# Patient Record
Sex: FEMALE | URBAN - METROPOLITAN AREA
[De-identification: names, ages, dates, MRNs, and addresses within clinical notes are randomized per-mention and may not be internally consistent; named-entity substitution may affect disease eponyms.]

---

## 2022-11-16 ENCOUNTER — OFFICE VISIT (OUTPATIENT)
Dept: URBAN - METROPOLITAN AREA CLINIC 27 | Facility: CLINIC | Age: 32
End: 2022-11-16
Payer: MEDICAID

## 2022-11-16 ENCOUNTER — LAB OUTSIDE AN ENCOUNTER (OUTPATIENT)
Dept: URBAN - METROPOLITAN AREA CLINIC 27 | Facility: CLINIC | Age: 32
End: 2022-11-16

## 2022-11-16 VITALS
HEART RATE: 64 BPM | DIASTOLIC BLOOD PRESSURE: 74 MMHG | BODY MASS INDEX: 24.48 KG/M2 | WEIGHT: 133 LBS | HEIGHT: 62 IN | SYSTOLIC BLOOD PRESSURE: 115 MMHG

## 2022-11-16 DIAGNOSIS — R10.12 LEFT UPPER QUADRANT ABDOMINAL PAIN: ICD-10-CM

## 2022-11-16 DIAGNOSIS — K59.01 CONSTIPATION: ICD-10-CM

## 2022-11-16 PROCEDURE — 99204 OFFICE O/P NEW MOD 45 MIN: CPT | Performed by: INTERNAL MEDICINE

## 2022-11-16 RX ORDER — DICYCLOMINE HYDROCHLORIDE 10 MG/1
1-2 CAPSULES CAPSULE ORAL
Qty: 90 | Refills: 3 | OUTPATIENT
Start: 2022-11-16 | End: 2023-03-16

## 2022-11-16 NOTE — HPI-TODAY'S VISIT:
32-year-old female here for left-sided abdominal pain.  Pain is in the left upper quadrant, left flank and radiates to her back.  She has had this discomfort for years.  She had uterine fibroids removed which did not help.  She had not endoscopy at an outside facility that showed H. pylori.  She was given antibiotics and breath test confirmed eradication of bacteria.  No improvement despite treatment of H. pylori.  Denies melena, dysphagia, weight loss.  She also has constipation, can go 1 to 2 weeks without a bowel movement if she does not take laxatives.  She has tried over-the-counter Dulcolax, MiraLAX, fiber supplements without good response.  Pain typically does not get better with bowel movements. No FH of colon cancer or IBD.

## 2022-11-18 ENCOUNTER — P2P PATIENT RECORD (OUTPATIENT)
Age: 32
End: 2022-11-18

## 2022-11-23 ENCOUNTER — TELEPHONE ENCOUNTER (OUTPATIENT)
Dept: URBAN - METROPOLITAN AREA CLINIC 27 | Facility: CLINIC | Age: 32
End: 2022-11-23

## 2022-12-01 ENCOUNTER — OFFICE VISIT (OUTPATIENT)
Dept: URBAN - METROPOLITAN AREA CLINIC 27 | Facility: CLINIC | Age: 32
End: 2022-12-01
Payer: MEDICAID

## 2022-12-01 VITALS
DIASTOLIC BLOOD PRESSURE: 62 MMHG | WEIGHT: 133 LBS | SYSTOLIC BLOOD PRESSURE: 101 MMHG | BODY MASS INDEX: 24.48 KG/M2 | HEIGHT: 62 IN | HEART RATE: 80 BPM

## 2022-12-01 DIAGNOSIS — R10.12 LEFT UPPER QUADRANT ABDOMINAL PAIN: ICD-10-CM

## 2022-12-01 DIAGNOSIS — K59.04 CHRONIC IDIOPATHIC CONSTIPATION: ICD-10-CM

## 2022-12-01 PROCEDURE — 99214 OFFICE O/P EST MOD 30 MIN: CPT | Performed by: INTERNAL MEDICINE

## 2022-12-01 RX ORDER — OMEPRAZOLE 40 MG/1
1 CAPSULE 30 MINUTES BEFORE MORNING MEAL CAPSULE, DELAYED RELEASE ORAL ONCE A DAY
Qty: 30 | Refills: 5 | OUTPATIENT
Start: 2022-12-01

## 2022-12-01 RX ORDER — DICYCLOMINE HYDROCHLORIDE 10 MG/1
1-2 CAPSULES CAPSULE ORAL
Qty: 90 | Refills: 3 | Status: ACTIVE | COMMUNITY
Start: 2022-11-16 | End: 2023-03-16

## 2022-12-01 NOTE — HPI-TODAY'S VISIT:
32-year-old female here for follow-up of abdominal pain.  Pain is predominantly in the left upper quadrant and radiates to her back.  She has had this discomfort for years.  She had uterine fibroids removed which did not help.  She had an EGD that showed H. pylori, was given antibiotics.  She cannot recall if she had a breath test to confirm eradication.  No improvement despite antibiotics.  She also has constipation.  She was given Linzess samples at last visit which helped, but the 145 mcg caused diarrhea and 72 mcg did not help.  She has tried multiple over-the-counter laxatives without improvement.  She does use marijuana on a daily basis. No FH of colon cancer. CT was normal.

## 2022-12-02 LAB
H PYLORI BREATH TEST: DETECTED
H. PYLORI BREATH TEST: DETECTED
INTERPRETATION: DETECTED

## 2022-12-06 ENCOUNTER — OFFICE VISIT (OUTPATIENT)
Dept: URBAN - METROPOLITAN AREA CLINIC 27 | Facility: CLINIC | Age: 32
End: 2022-12-06

## 2022-12-08 ENCOUNTER — TELEPHONE ENCOUNTER (OUTPATIENT)
Dept: URBAN - METROPOLITAN AREA CLINIC 27 | Facility: CLINIC | Age: 32
End: 2022-12-08

## 2022-12-08 RX ORDER — OMEPRAZOLE 40 MG/1
1 TABLET BEFORE MEALS CAPSULE, DELAYED RELEASE ORAL TWICE A DAY
Qty: 28 TABLET | Refills: 0 | OUTPATIENT
Start: 2022-12-09

## 2022-12-08 RX ORDER — DICYCLOMINE HYDROCHLORIDE 10 MG/1
1-2 CAPSULES CAPSULE ORAL
Qty: 90 | Refills: 3 | Status: ACTIVE | COMMUNITY
Start: 2022-11-16 | End: 2023-03-16

## 2022-12-08 RX ORDER — CLARITHROMYCIN 500 MG/1
1 TABLET TABLET, FILM COATED ORAL
Qty: 28 | Refills: 0 | OUTPATIENT
Start: 2022-12-09 | End: 2022-12-23

## 2022-12-08 RX ORDER — AMOXICILLIN 500 MG/1
1 CAPSULE CAPSULE ORAL TWICE A DAY
Qty: 28 CAPSULE | Refills: 0 | OUTPATIENT
Start: 2022-12-09 | End: 2022-12-23

## 2022-12-08 RX ORDER — OMEPRAZOLE 40 MG/1
1 CAPSULE 30 MINUTES BEFORE MORNING MEAL CAPSULE, DELAYED RELEASE ORAL ONCE A DAY
Qty: 30 | Refills: 5 | Status: ACTIVE | COMMUNITY
Start: 2022-12-01

## 2022-12-09 ENCOUNTER — TELEPHONE ENCOUNTER (OUTPATIENT)
Dept: URBAN - METROPOLITAN AREA CLINIC 27 | Facility: CLINIC | Age: 32
End: 2022-12-09

## 2022-12-09 ENCOUNTER — ERX REFILL RESPONSE (OUTPATIENT)
Dept: URBAN - METROPOLITAN AREA CLINIC 27 | Facility: CLINIC | Age: 32
End: 2022-12-09

## 2022-12-09 RX ORDER — OMEPRAZOLE 20 MG/1
1 CAPSULE CAPSULE, DELAYED RELEASE ORAL TWICE DAILY
Qty: 28 | Refills: 0 | OUTPATIENT
Start: 2022-12-09

## 2022-12-09 RX ORDER — CLARITHROMYCIN 500 MG/1
1 TABLET TABLET, FILM COATED ORAL
Qty: 28 TABLET | Refills: 0 | OUTPATIENT
Start: 2022-12-09 | End: 2022-12-23

## 2022-12-09 RX ORDER — DICYCLOMINE HYDROCHLORIDE 10 MG/1
TAKE 1-2 CAPSULES ORALLY EVERY 6 HOURS AS NEEDED FOR ABDOMINAL PAIN 30 DAY(S) CAPSULE ORAL
Qty: 90 CAPSULE | Refills: 3 | OUTPATIENT

## 2022-12-09 RX ORDER — DICYCLOMINE HYDROCHLORIDE 10 MG/1
1-2 CAPSULES CAPSULE ORAL
Qty: 90 | Refills: 3 | OUTPATIENT

## 2022-12-09 RX ORDER — AMOXICILLIN 500 MG/1
2 CAPSULES CAPSULE ORAL TWICE A DAY
Qty: 56 CAPSULE | Refills: 0 | OUTPATIENT
Start: 2022-12-09 | End: 2022-12-23

## 2022-12-27 ENCOUNTER — TELEPHONE ENCOUNTER (OUTPATIENT)
Dept: URBAN - METROPOLITAN AREA CLINIC 27 | Facility: CLINIC | Age: 32
End: 2022-12-27

## 2022-12-27 RX ORDER — LACTULOSE 10 G/15ML
15 ML AS NEEDED SOLUTION ORAL ONCE A DAY
Qty: 450 | OUTPATIENT
Start: 2022-12-28 | End: 2023-01-27

## 2023-01-03 ENCOUNTER — TELEPHONE ENCOUNTER (OUTPATIENT)
Dept: URBAN - METROPOLITAN AREA CLINIC 27 | Facility: CLINIC | Age: 33
End: 2023-01-03

## 2023-01-03 ENCOUNTER — ERX REFILL RESPONSE (OUTPATIENT)
Dept: URBAN - METROPOLITAN AREA CLINIC 27 | Facility: CLINIC | Age: 33
End: 2023-01-03

## 2023-01-03 RX ORDER — OMEPRAZOLE 40 MG/1
TAKE 1 CAPSULE BY MOUTH EVERY DAY 30 MINUTES BEFORE MORNING MEAL FOR 30 DAYS CAPSULE, DELAYED RELEASE ORAL
Qty: 30 CAPSULE | Refills: 5 | OUTPATIENT

## 2023-01-03 RX ORDER — OMEPRAZOLE 40 MG/1
1 CAPSULE 30 MINUTES BEFORE MORNING MEAL CAPSULE, DELAYED RELEASE ORAL ONCE A DAY
Qty: 30 | Refills: 5 | OUTPATIENT

## 2023-01-11 ENCOUNTER — OFFICE VISIT (OUTPATIENT)
Dept: URBAN - METROPOLITAN AREA CLINIC 27 | Facility: CLINIC | Age: 33
End: 2023-01-11

## 2023-01-18 ENCOUNTER — LAB OUTSIDE AN ENCOUNTER (OUTPATIENT)
Dept: URBAN - METROPOLITAN AREA CLINIC 27 | Facility: CLINIC | Age: 33
End: 2023-01-18

## 2023-01-18 ENCOUNTER — TELEPHONE ENCOUNTER (OUTPATIENT)
Dept: URBAN - METROPOLITAN AREA CLINIC 27 | Facility: CLINIC | Age: 33
End: 2023-01-18

## 2023-01-18 ENCOUNTER — OFFICE VISIT (OUTPATIENT)
Dept: URBAN - METROPOLITAN AREA CLINIC 27 | Facility: CLINIC | Age: 33
End: 2023-01-18
Payer: MEDICAID

## 2023-01-18 ENCOUNTER — OFFICE VISIT (OUTPATIENT)
Dept: URBAN - METROPOLITAN AREA CLINIC 26 | Facility: CLINIC | Age: 33
End: 2023-01-18
Payer: MEDICAID

## 2023-01-18 VITALS
DIASTOLIC BLOOD PRESSURE: 66 MMHG | HEIGHT: 62 IN | WEIGHT: 132 LBS | BODY MASS INDEX: 24.29 KG/M2 | SYSTOLIC BLOOD PRESSURE: 105 MMHG | HEART RATE: 81 BPM

## 2023-01-18 DIAGNOSIS — R10.12 LEFT UPPER QUADRANT ABDOMINAL PAIN: ICD-10-CM

## 2023-01-18 DIAGNOSIS — K59.04 CHRONIC IDIOPATHIC CONSTIPATION: ICD-10-CM

## 2023-01-18 PROBLEM — 82934008 CHRONIC IDIOPATHIC CONSTIPATION: Status: ACTIVE | Noted: 2022-12-01

## 2023-01-18 PROCEDURE — 83014 H PYLORI DRUG ADMIN: CPT | Performed by: INTERNAL MEDICINE

## 2023-01-18 PROCEDURE — 83013 H PYLORI (C-13) BREATH: CPT | Performed by: INTERNAL MEDICINE

## 2023-01-18 PROCEDURE — 99214 OFFICE O/P EST MOD 30 MIN: CPT | Performed by: INTERNAL MEDICINE

## 2023-01-18 RX ORDER — DICYCLOMINE HYDROCHLORIDE 10 MG/1
TAKE 1-2 CAPSULES ORALLY EVERY 6 HOURS AS NEEDED FOR ABDOMINAL PAIN 30 DAY(S) CAPSULE ORAL
Qty: 90 CAPSULE | Refills: 3 | Status: ACTIVE | COMMUNITY

## 2023-01-18 RX ORDER — OMEPRAZOLE 40 MG/1
TAKE 1 CAPSULE BY MOUTH EVERY DAY 30 MINUTES BEFORE MORNING MEAL FOR 30 DAYS CAPSULE, DELAYED RELEASE ORAL
Qty: 30 CAPSULE | Refills: 5 | Status: ACTIVE | COMMUNITY

## 2023-01-18 RX ORDER — OMEPRAZOLE 20 MG/1
1 CAPSULE CAPSULE, DELAYED RELEASE ORAL TWICE DAILY
Qty: 28 | Refills: 0 | Status: ACTIVE | COMMUNITY
Start: 2022-12-09

## 2023-01-18 RX ORDER — POLYETHYLENE GLYCOL 3350, SODIUM SULFATE ANHYDROUS, SODIUM BICARBONATE, SODIUM CHLORIDE, POTASSIUM CHLORIDE 236; 22.74; 6.74; 5.86; 2.97 G/4L; G/4L; G/4L; G/4L; G/4L
AS DIRECTED POWDER, FOR SOLUTION ORAL ONCE
Qty: 1 KIT | Refills: 0 | OUTPATIENT
Start: 2023-01-19 | End: 2023-01-20

## 2023-01-18 RX ORDER — LACTULOSE 10 G/15ML
15 ML AS NEEDED SOLUTION ORAL ONCE A DAY
Qty: 450 | Status: ACTIVE | COMMUNITY
Start: 2022-12-28 | End: 2023-01-27

## 2023-01-18 NOTE — HPI-TODAY'S VISIT:
32-year-old female here for follow-up of constipation, abdominal pain.  She has had H. pylori treated at least 6-7 times in the past.  She finished antibiotics recently and pain is no better.  She has tried multiple medications for constipation including Linzess, Amitiza and samples of Trulance.  Most recently, she was put on lactulose with about 1-2 bowel movements per week which is improved.

## 2023-01-24 ENCOUNTER — ERX REFILL RESPONSE (OUTPATIENT)
Dept: URBAN - METROPOLITAN AREA CLINIC 27 | Facility: CLINIC | Age: 33
End: 2023-01-24

## 2023-01-24 RX ORDER — LACTULOSE 10 G/15ML
TAKE 15 ML BY MOUTH DAILY AS NEEDED SOLUTION ORAL
Qty: 473 MILLILITER | Refills: 0 | OUTPATIENT

## 2023-01-24 RX ORDER — LACTULOSE 10 G/15ML
15 ML AS NEEDED SOLUTION ORAL ONCE A DAY
Qty: 450 | OUTPATIENT

## 2023-02-22 ENCOUNTER — ERX REFILL RESPONSE (OUTPATIENT)
Dept: URBAN - METROPOLITAN AREA CLINIC 27 | Facility: CLINIC | Age: 33
End: 2023-02-22

## 2023-02-22 RX ORDER — LACTULOSE 10 G/15ML
TAKE 15 ML BY MOUTH DAILY AS NEEDED 30 SOLUTION ORAL
Qty: 473 MILLILITER | Refills: 0 | OUTPATIENT

## 2023-02-22 RX ORDER — LACTULOSE 10 G/15ML
TAKE 15 ML BY MOUTH DAILY AS NEEDED SOLUTION ORAL
Qty: 473 MILLILITER | Refills: 0 | OUTPATIENT

## 2023-03-01 ENCOUNTER — OFFICE VISIT (OUTPATIENT)
Dept: URBAN - METROPOLITAN AREA MEDICAL CENTER 8 | Facility: MEDICAL CENTER | Age: 33
End: 2023-03-01
Payer: MEDICAID

## 2023-03-01 DIAGNOSIS — K29.60 ADENOPAPILLOMATOSIS GASTRICA: ICD-10-CM

## 2023-03-01 DIAGNOSIS — R10.84 ABDOMINAL CRAMPING, GENERALIZED: ICD-10-CM

## 2023-03-01 PROCEDURE — 45378 DIAGNOSTIC COLONOSCOPY: CPT | Performed by: INTERNAL MEDICINE

## 2023-03-01 PROCEDURE — 43239 EGD BIOPSY SINGLE/MULTIPLE: CPT | Performed by: INTERNAL MEDICINE

## 2023-03-27 ENCOUNTER — DASHBOARD ENCOUNTERS (OUTPATIENT)
Age: 33
End: 2023-03-27

## 2023-03-27 ENCOUNTER — OFFICE VISIT (OUTPATIENT)
Dept: URBAN - METROPOLITAN AREA CLINIC 27 | Facility: CLINIC | Age: 33
End: 2023-03-27
Payer: MEDICAID

## 2023-03-27 VITALS
RESPIRATION RATE: 17 BRPM | HEART RATE: 84 BPM | SYSTOLIC BLOOD PRESSURE: 104 MMHG | WEIGHT: 130 LBS | DIASTOLIC BLOOD PRESSURE: 80 MMHG | BODY MASS INDEX: 23.92 KG/M2 | HEIGHT: 62 IN

## 2023-03-27 DIAGNOSIS — R10.84 GENERALIZED ABDOMINAL DISCOMFORT: ICD-10-CM

## 2023-03-27 PROCEDURE — 99214 OFFICE O/P EST MOD 30 MIN: CPT | Performed by: INTERNAL MEDICINE

## 2023-03-27 RX ORDER — DICYCLOMINE HYDROCHLORIDE 10 MG/1
TAKE 1-2 CAPSULES ORALLY EVERY 6 HOURS AS NEEDED FOR ABDOMINAL PAIN 30 DAY(S) CAPSULE ORAL
Qty: 90 CAPSULE | Refills: 3 | Status: ACTIVE | COMMUNITY

## 2023-03-27 RX ORDER — LACTULOSE 10 G/15ML
TAKE 15 ML BY MOUTH DAILY AS NEEDED 30 SOLUTION ORAL
Qty: 473 MILLILITER | Refills: 0 | Status: ACTIVE | COMMUNITY

## 2023-03-27 RX ORDER — OMEPRAZOLE 40 MG/1
TAKE 1 CAPSULE BY MOUTH EVERY DAY 30 MINUTES BEFORE MORNING MEAL FOR 30 DAYS CAPSULE, DELAYED RELEASE ORAL
Qty: 30 CAPSULE | Refills: 5 | Status: ACTIVE | COMMUNITY

## 2023-03-27 RX ORDER — OMEPRAZOLE 20 MG/1
1 CAPSULE CAPSULE, DELAYED RELEASE ORAL TWICE DAILY
Qty: 28 | Refills: 0 | Status: ACTIVE | COMMUNITY
Start: 2022-12-09

## 2023-03-27 NOTE — HPI-TODAY'S VISIT:
33-year-old female here for follow-up of abdominal pain, constipation.  She has had H. pylori treated several times in the past.  Symptoms of abdominal pain never improved with treatment.  She had an EGD recently, biopsies were negative for H. pylori.  She has tried Linzess and Amitiza in the past without improvement in symptoms.  Most recently, was put on lactulose which does help with bowel movements, but not abdominal pain.  Symptoms seem to start about 7 or 8 years ago at which time she had an IUD removed and had a child.

## 2024-06-13 NOTE — PHYSICAL EXAM EYES:
Conjuntivae and eyelids appear normal,  Sclerae : White without injection Bill For Surgical Tray: no Performing Laboratory: [id number] Billing Type: Third-Party Bill Expected Date Of Service: 06/13/2024